# Patient Record
Sex: MALE | Race: WHITE
[De-identification: names, ages, dates, MRNs, and addresses within clinical notes are randomized per-mention and may not be internally consistent; named-entity substitution may affect disease eponyms.]

---

## 2017-06-19 ENCOUNTER — HOSPITAL ENCOUNTER (INPATIENT)
Dept: HOSPITAL 76 - ED | Age: 67
LOS: 1 days | Discharge: ANOTHER HOSPITAL | DRG: 378 | End: 2017-06-20
Attending: INTERNAL MEDICINE | Admitting: NURSE PRACTITIONER
Payer: MEDICARE

## 2017-06-19 DIAGNOSIS — K21.9: ICD-10-CM

## 2017-06-19 DIAGNOSIS — D62: ICD-10-CM

## 2017-06-19 DIAGNOSIS — I10: ICD-10-CM

## 2017-06-19 DIAGNOSIS — Z87.898: ICD-10-CM

## 2017-06-19 DIAGNOSIS — K92.0: Primary | ICD-10-CM

## 2017-06-19 DIAGNOSIS — K44.9: ICD-10-CM

## 2017-06-19 DIAGNOSIS — Z90.49: ICD-10-CM

## 2017-06-19 DIAGNOSIS — K22.4: ICD-10-CM

## 2017-06-19 DIAGNOSIS — I50.9: ICD-10-CM

## 2017-06-19 DIAGNOSIS — K25.4: ICD-10-CM

## 2017-06-19 DIAGNOSIS — Z87.891: ICD-10-CM

## 2017-06-19 DIAGNOSIS — Z79.899: ICD-10-CM

## 2017-06-19 LAB
ALBUMIN/GLOB SERPL: 1.3 {RATIO} (ref 1–2.2)
ANION GAP SERPL CALCULATED.4IONS-SCNC: 12 MMOL/L (ref 6–13)
APTT PPP: 25.9 SECS (ref 24.9–33.3)
BASOPHILS NFR BLD AUTO: 0 10^3/UL (ref 0–0.1)
BASOPHILS NFR BLD AUTO: 0 10^3/UL (ref 0–0.1)
BASOPHILS NFR BLD AUTO: 0.3 %
BASOPHILS NFR BLD AUTO: 0.3 %
BILIRUB BLD-MCNC: 1.8 MG/DL (ref 0.2–1)
BUN SERPL-MCNC: 50 MG/DL (ref 6–20)
CALCIUM UR-MCNC: 8.3 MG/DL (ref 8.5–10.3)
CHLORIDE SERPL-SCNC: 105 MMOL/L (ref 101–111)
CO2 SERPL-SCNC: 22 MMOL/L (ref 21–32)
CREAT SERPLBLD-SCNC: 0.8 MG/DL (ref 0.6–1.2)
EOSINOPHIL # BLD AUTO: 0 10^3/UL (ref 0–0.7)
EOSINOPHIL # BLD AUTO: 0.1 10^3/UL (ref 0–0.7)
EOSINOPHIL NFR BLD AUTO: 0.1 %
EOSINOPHIL NFR BLD AUTO: 0.5 %
ERYTHROCYTE [DISTWIDTH] IN BLOOD BY AUTOMATED COUNT: 13.6 % (ref 12–15)
ERYTHROCYTE [DISTWIDTH] IN BLOOD BY AUTOMATED COUNT: 13.6 % (ref 12–15)
ERYTHROCYTE [DISTWIDTH] IN BLOOD BY AUTOMATED COUNT: 14.6 % (ref 12–15)
ERYTHROCYTE [DISTWIDTH] IN BLOOD BY AUTOMATED COUNT: 14.7 % (ref 12–15)
GFRSERPLBLD MDRD-ARVRAT: 97 ML/MIN/{1.73_M2} (ref 89–?)
GLOBULIN SER-MCNC: 3 G/DL (ref 2.1–4.2)
GLUCOSE SERPL-MCNC: 217 MG/DL (ref 70–100)
HCT VFR BLD AUTO: 24.3 % (ref 42–52)
HCT VFR BLD AUTO: 27.4 % (ref 42–52)
HCT VFR BLD AUTO: 30.9 % (ref 42–52)
HCT VFR BLD AUTO: 35.9 % (ref 42–52)
HGB UR QL STRIP: 10 G/DL (ref 14–18)
HGB UR QL STRIP: 12 G/DL (ref 14–18)
HGB UR QL STRIP: 7.9 G/DL (ref 14–18)
HGB UR QL STRIP: 9.3 G/DL (ref 14–18)
INR PPP: 1.2 (ref 0.8–1.2)
LIPASE SERPL-CCNC: 26 U/L (ref 22–51)
LYMPHOCYTES # SPEC AUTO: 1.2 10^3/UL (ref 1.5–3.5)
LYMPHOCYTES # SPEC AUTO: 2.3 10^3/UL (ref 1.5–3.5)
LYMPHOCYTES NFR BLD AUTO: 10.2 %
LYMPHOCYTES NFR BLD AUTO: 17.9 %
MCH RBC QN AUTO: 29.2 PG (ref 27–31)
MCH RBC QN AUTO: 29.8 PG (ref 27–31)
MCH RBC QN AUTO: 30.5 PG (ref 27–31)
MCH RBC QN AUTO: 31.1 PG (ref 27–31)
MCHC RBC AUTO-ENTMCNC: 32.4 G/DL (ref 32–36)
MCHC RBC AUTO-ENTMCNC: 32.5 G/DL (ref 32–36)
MCHC RBC AUTO-ENTMCNC: 33.5 G/DL (ref 32–36)
MCHC RBC AUTO-ENTMCNC: 33.8 G/DL (ref 32–36)
MCV RBC AUTO: 90.1 FL (ref 80–94)
MCV RBC AUTO: 90.8 FL (ref 80–94)
MCV RBC AUTO: 91.9 FL (ref 80–94)
MCV RBC AUTO: 92 FL (ref 80–94)
MONOCYTES # BLD AUTO: 0.9 10^3/UL (ref 0–1)
MONOCYTES # BLD AUTO: 1 10^3/UL (ref 0–1)
MONOCYTES NFR BLD AUTO: 7.2 %
MONOCYTES NFR BLD AUTO: 7.8 %
NEUTROPHILS # BLD AUTO: 9.6 10^3/UL (ref 1.5–6.6)
NEUTROPHILS # BLD AUTO: 9.8 10^3/UL (ref 1.5–6.6)
NEUTROPHILS # SNV AUTO: 12 X10^3/UL (ref 4.8–10.8)
NEUTROPHILS # SNV AUTO: 13 X10^3/UL (ref 4.8–10.8)
NEUTROPHILS # SNV AUTO: 13.1 X10^3/UL (ref 4.8–10.8)
NEUTROPHILS # SNV AUTO: 21.8 X10^3/UL (ref 4.8–10.8)
NEUTROPHILS NFR BLD AUTO: 73.5 %
NEUTROPHILS NFR BLD AUTO: 82.2 %
NRBC # BLD AUTO: 0 /100WBC
NRBC # BLD AUTO: 0 /100WBC
PDW BLD AUTO: 8 FL (ref 7.4–11.4)
PDW BLD AUTO: 8.1 FL (ref 7.4–11.4)
PDW BLD AUTO: 8.7 FL (ref 7.4–11.4)
PDW BLD AUTO: 8.8 FL (ref 7.4–11.4)
POTASSIUM SERPL-SCNC: 4.3 MMOL/L (ref 3.5–5)
PROT SPEC-MCNC: 6.8 G/DL (ref 6.7–8.2)
PROTHROM ACT/NOR PPP: 13.3 SECS (ref 9.9–12.6)
RBC MAR: 2.64 10^6/UL (ref 4.7–6.1)
RBC MAR: 2.97 10^6/UL (ref 4.7–6.1)
RBC MAR: 3.43 10^6/UL (ref 4.7–6.1)
RBC MAR: 3.95 10^6/UL (ref 4.7–6.1)
SODIUM SERPLBLD-SCNC: 139 MMOL/L (ref 135–145)
WBC # BLD: 12 X10^3/UL
WBC # BLD: 13.1 X10^3/UL

## 2017-06-19 PROCEDURE — 36415 COLL VENOUS BLD VENIPUNCTURE: CPT

## 2017-06-19 PROCEDURE — 85014 HEMATOCRIT: CPT

## 2017-06-19 PROCEDURE — 93005 ELECTROCARDIOGRAM TRACING: CPT

## 2017-06-19 PROCEDURE — 85730 THROMBOPLASTIN TIME PARTIAL: CPT

## 2017-06-19 PROCEDURE — 82248 BILIRUBIN DIRECT: CPT

## 2017-06-19 PROCEDURE — 85610 PROTHROMBIN TIME: CPT

## 2017-06-19 PROCEDURE — 88305 TISSUE EXAM BY PATHOLOGIST: CPT

## 2017-06-19 PROCEDURE — 86920 COMPATIBILITY TEST SPIN: CPT

## 2017-06-19 PROCEDURE — 0W3P8ZZ CONTROL BLEEDING IN GASTROINTESTINAL TRACT, VIA NATURAL OR ARTIFICIAL OPENING ENDOSCOPIC: ICD-10-PCS | Performed by: SURGERY

## 2017-06-19 PROCEDURE — 86850 RBC ANTIBODY SCREEN: CPT

## 2017-06-19 PROCEDURE — 86900 BLOOD TYPING SEROLOGIC ABO: CPT

## 2017-06-19 PROCEDURE — 94002 VENT MGMT INPAT INIT DAY: CPT

## 2017-06-19 PROCEDURE — 96361 HYDRATE IV INFUSION ADD-ON: CPT

## 2017-06-19 PROCEDURE — 85025 COMPLETE CBC W/AUTO DIFF WBC: CPT

## 2017-06-19 PROCEDURE — 82803 BLOOD GASES ANY COMBINATION: CPT

## 2017-06-19 PROCEDURE — 83690 ASSAY OF LIPASE: CPT

## 2017-06-19 PROCEDURE — 96374 THER/PROPH/DIAG INJ IV PUSH: CPT

## 2017-06-19 PROCEDURE — 86901 BLOOD TYPING SEROLOGIC RH(D): CPT

## 2017-06-19 PROCEDURE — 84484 ASSAY OF TROPONIN QUANT: CPT

## 2017-06-19 PROCEDURE — 85018 HEMOGLOBIN: CPT

## 2017-06-19 PROCEDURE — 99285 EMERGENCY DEPT VISIT HI MDM: CPT

## 2017-06-19 PROCEDURE — 80320 DRUG SCREEN QUANTALCOHOLS: CPT

## 2017-06-19 PROCEDURE — 96375 TX/PRO/DX INJ NEW DRUG ADDON: CPT

## 2017-06-19 PROCEDURE — 99284 EMERGENCY DEPT VISIT MOD MDM: CPT

## 2017-06-19 PROCEDURE — 71020: CPT

## 2017-06-19 PROCEDURE — 80053 COMPREHEN METABOLIC PANEL: CPT

## 2017-06-19 RX ADMIN — SODIUM CHLORIDE, PRESERVATIVE FREE SCH ML: 5 INJECTION INTRAVENOUS at 17:12

## 2017-06-19 RX ADMIN — SODIUM CHLORIDE, PRESERVATIVE FREE SCH ML: 5 INJECTION INTRAVENOUS at 17:19

## 2017-06-19 RX ADMIN — SODIUM CHLORIDE SCH MLS/HR: 9 INJECTION, SOLUTION INTRAVENOUS at 17:19

## 2017-06-19 NOTE — XRAY PRELIMINARY REPORT
Accession: [id number]

Exam: XR Chest 2 View PA/LAT

 

IMPRESSION: No acute intrathoracic plain film abnormality. 

 

[facility]

 

SITE ID: [id number]

## 2017-06-19 NOTE — XRAY REPORT
EXAM:

CHEST RADIOGRAPHY

 

EXAM DATE: 6/19/2017 01:47 PM.

 

CLINICAL HISTORY: Hematemesis.

 

COMPARISON: 03/08/2015.

 

TECHNIQUE: 2 views.

 

FINDINGS: 

Lungs/Pleura: No focal opacities evident. No pleural effusion. No pneumothorax. Normal volumes.

 

Mediastinum: Heart and mediastinal contours are unremarkable.

 

Other: There is right humerus internal fixation hardware. No acute bony abnormalities are seen.

 

IMPRESSION: No acute intrathoracic plain film abnormality. 

 

[facility]

Referring Provider Line: [phone]

 

SITE ID: [id number]

## 2017-06-19 NOTE — HISTORY & PHYSICAL EXAMINATION
DATE OF ADMISSION: 2017

 

PRIMARY CARE PHYSICIAN: DELTA Reza.

 

CHIEF COMPLAINT: He developed coffee ground emesis and dark black stool last 
night at the same time.

 

HISTORY OF PRESENT ILLNESS: This is a 66-year-old gentleman who has enjoyed 
relatively good health until now just with hypertension and history of what he 
describes as esophageal spasm, who was feeling well until yesterday when he 
developed a pretty much at the same time black coffee ground emesis. The wife 
says it was approximately a cup and also had black dark stool. He comes to the 
emergency room today without having had an additional coffee-ground emesis or 
black stool. However, with a hemoglobin and hematocrit of 12 and 36 and a white 
count of 13.1, platelets 206,000. He does not had any prior history of GI 
bleed. He does not have a history of bleeding diathesis. He is not on an 
antiplatelet agents, nor anticoagulants, does not take aspirin, does not take 
NSAIDs, is also not on any PPI or H2 blocker, does not take any herbals. He 
does drink alcohol, but says this is 1-2 glasses of wine a night. To the ER, he 
said that it was a glass or 2 of wine every other night. Regarding his 
esophageal spasm he describes this as food getting stuck part way down, it only 
happens a couple times a year and when it does happen, it is uncomfortable all 
the way down. He says that if you gave me some steak right now I could make it 
happen. He denies that he has ever been diagnosed with eosinophilic 
esophagitis. He has never had a manometry. He normally has a formed brown bowel 
movement on pretty much daily basis. With blood loss he denies any shortness of 
breath, no new chest pain, possibly a little bit diaphoretic and a little bit 
lightheaded. His INR is 1.2. Platelets 206,000. He had not yet had orthostatics 
checked before I came to the ER, we did them down there and he had a 
significant drop from systolic of 131/67 to a systolic of 102 with the 
development of lightheadedness and he had to sit down, also his heart rate went 
from . He has already received 1 liter of normal saline in the emergency 
room, he was also seen already by Dr. Burgos of surgery for consideration of 
EGD and colonoscopy. I have not yet spoken with Dr. Burgos. He reports that 
he had a colonoscopy approximately a year ago which he says was completely 
normal. 

 

PAST SURGICAL HISTORY:

1. Ventral hernia repair which has required several surgeries including a colon 
resection.

2. Right arm fracture 2 to 3 years ago. 

 

PAST MEDICAL HISTORY:

1. Hypertension for which he is on lisinopril.

2. History of esophageal spasm.

 

MEDICATIONS AT HOME:

1. Lisinopril thiazide combination is 20:25.

2. He takes p.r.n. nitroglycerin, which I believe is due to his esophageal 
spasms.

 

ALLERGIES: HE HAS WHAT HE REFERS TO AS ALLERGIES TO TYLENOL. THIS IS AN ADVERSE 
DRUG REACTION FORM OF NAUSEA AND VOMITING.

 

SOCIAL HISTORY: He is retired from the [employer], he even spent some time 
living in [facility] [place] He is . His wife is with him. He has 2 children 
who are healthy. As above, he 1-2 drinks of wine per night. Does not smoke any 
illicit's and smoked years ago.

 

FAMILY HISTORY: Parents  well into their 90s. He denies any family history 
of heart disease, diabetes, cancer or any bleeding diathesis.

 

REVIEW OF SYSTEMS: He denies any unintentional weight gain or weight loss. No 
fevers, night sweats, lymphadenopathy, no new activity intolerance other than 
that since yesterday with blood loss.

HEAD, EARS, EYES. NOSE AND, THROAT: No vision changes. No trouble chewing or 
swallowing. No neck stiffness.

CARDIOVASCULAR: No chest pain or pressure suggestive of ischemia. No 
palpitations. No edema. No near syncope only as that described above 
lightheadedness with the blood loss.

RESPIRATORY: No cough, wheezing, or shortness of breath.

GASTROINTESTINAL: As per the HPI.

GENITOURINARY: Denies urinary frequency, dysuria, or hesitancy.

MUSCULOSKELETAL: No complaints of joint pain or swelling. No falls.

SKIN: No changes.

NEUROLOGIC: No weakness, paresthesias, or discoordination. No confusion or 
memory loss.

PSYCHIATRIC None.

ENDOCRINE: No polyuria, polydipsia. No heat or cold intolerance.

HEMATOLOGIC: No easy bruising or bleeding, other than the GI bleed described 
above.

 

PHYSICAL EXAMINATION: 

GENERAL: In the emergency room, orthostatics are as described above with a drop 
in his systolic from 131 to 102 in the upright position and increase in his 
heart rate upon standing gradually from 95 to 115, may have gone further, but I 
was not present in the room for the rest of it. He is a very pleasant gentleman 
who appears stated age 66. His wife is at the bedside. He is lying in bed on 
the stretcher in no acute distress and participates in the interview. On 
standing for orthostatic he definitely was started to feel lightheaded and 
uncomfortable and was sat back down.

HEAD, EYES, EARS, NOSE AND THROAT: He has a normal distribution of hair. His 
sclerae are anicteric. Pupils are approximately 3-4 mm with react to light, and 
extraocular movements intact. Oral mucosa is moist. He has his own dentition, 
which is adequate.

NECK: Supple. Carotids are +2 with no extra sounds. No bruit. He was set up for 
a chest exam. He has no spinal tenderness or CVA tenderness.

CHEST: Clear to auscultation with unlabored respirations.

HEART: Regular S1, S2 with no appreciable extra sounds, periphery is warm and 
dry. There is no edema.

ABDOMEN: Notable for old midline scar and appears to be almost an umbilical 
hernia from old ventral hernia repair. He has active bowel sounds. His abdomen 
is nontender. I think I do feel the liver edge on deep inspiration, which is 
nontender.

EXTREMITIES: As above. Strength is grossly equal and no edema. Reflexes and 
strength were not assessed.

 

DIAGNOSTICS on admission. 

 

DIAGNOSTIC STUDIES: White count 13.1, hemoglobin 12.0, hematocrit 35.9, 
platelets 260,000 and INR is 1.2. Sodium 139, potassium 4.3, chloride 105, 
bicarbonate 32, BUN 50, creatinine 0.8, glucose 217, Total bilirubin is 1.8. 
AST and ALT are 28 and 33, respectively. Albumin is 3.8. Alcohol level is 5. 

 

EKG was normal sinus tachycardia at approximately 100 with no ischemic changes. 
Normal axis.

echo 2013 EF 70%  Grade II diastolic dysfunction

 

In looking for old blood work for comparison there are none in the system.

 

ASSESSMENT AND PLAN:

1. Acute gastrointestinal bleed, suspicious for upper bleed, especially given 
his elevated BUN and coffee ground emesis. He actually has hemodynamic 
instability with orthostasis as noted. In ED he received 2 L NS and Medsurg RN 
indicates patient up in room and no longer orthostatic . He still has good room 
on his hemoglobin thus far, we will have serial hematocrits every 6 hours. He 
also will check orthostatics with the continued normal saline volume 
resuscitation. Will continue that at 100 mL/hour. He has blood that has been 
typed and screened. I will make sure there is a cross available for 3 at all 
times in case this becomes a more brisk bleed. He has a large bore IV available
, he will be on clears tonight, n.p.o. after midnight for possible EGD in the 
morning by Dr. Burgos who has not yet had a formal consultation put in the 
computer. Patient indictes surgeon already spoke with him. He will continue IV 
Protonix twice daily. We will hold off on his lisinopril thiazide combination.

2. Hypertension. As above he is orthostatic. With blood loss we are holding off 
on his antihypertensives.

3. Venous thromboembolism prophylaxis. He will be on SCDs. We will not use any 
pharmacologic agent at this time.

4. CODE STATUS: HE IS A FULL CODE.

5. Slightly elevated total bilirubin. I will fractionate this, is only mildly 
elevated with no concomitant elevation in his transaminases.

6. Normocytic anemia, given that it is normocytic, this is likely all reflected 
to acute GI blood loss.

 

 

 

DD:2017 15:14:00  DT: 2017 18:08  JOB #: [id number]  EXT JOB #:[id number]

ALVIN

## 2017-06-20 VITALS — DIASTOLIC BLOOD PRESSURE: 61 MMHG | SYSTOLIC BLOOD PRESSURE: 108 MMHG

## 2017-06-20 LAB
BASOPHILS NFR BLD AUTO: 0.1 10^3/UL (ref 0–0.1)
BASOPHILS NFR BLD AUTO: 0.5 %
EOSINOPHIL # BLD AUTO: 0 10^3/UL (ref 0–0.7)
EOSINOPHIL NFR BLD AUTO: 0.1 %
ERYTHROCYTE [DISTWIDTH] IN BLOOD BY AUTOMATED COUNT: 15.2 % (ref 12–15)
HCT VFR BLD AUTO: 33.1 % (ref 42–52)
HCT VFR BLD AUTO: 38.8 % (ref 42–52)
HGB UR QL STRIP: 11.2 G/DL (ref 14–18)
HGB UR QL STRIP: 12.8 G/DL (ref 14–18)
LYMPHOCYTES # SPEC AUTO: 1.1 10^3/UL (ref 1.5–3.5)
LYMPHOCYTES NFR BLD AUTO: 8.1 %
MCH RBC QN AUTO: 29.7 PG (ref 27–31)
MCHC RBC AUTO-ENTMCNC: 33.8 G/DL (ref 32–36)
MCV RBC AUTO: 87.9 FL (ref 80–94)
MONOCYTES # BLD AUTO: 0.8 10^3/UL (ref 0–1)
MONOCYTES NFR BLD AUTO: 6 %
NEUTROPHILS # BLD AUTO: 11.9 10^3/UL (ref 1.5–6.6)
NEUTROPHILS # SNV AUTO: 14 X10^3/UL (ref 4.8–10.8)
NEUTROPHILS NFR BLD AUTO: 85.3 %
NRBC # BLD AUTO: 0.1 /100WBC
PDW BLD AUTO: 8.5 FL (ref 7.4–11.4)
RBC MAR: 3.76 10^6/UL (ref 4.7–6.1)
WBC # BLD: 14 X10^3/UL

## 2017-06-20 RX ADMIN — SODIUM CHLORIDE SCH MLS/HR: 9 INJECTION, SOLUTION INTRAVENOUS at 01:27

## 2017-06-22 NOTE — DISCHARGE SUMMARY
DATE OF ADMISSION: 06/19/2017

 

DATE OF DISCHARGE: 06/20/2017

 

DISCHARGE DIAGNOSIS:

1. Massive gastrointestinal bleeding, related to bleeding within a hiatal hernia with diffuse oozing 
and 1 small ulcer.

2. Acute blood loss anemia, status post 5 units packed red blood cells transfusion and 4 units of FFP
.

3. History of esophageal spasm.

4. History of hypertension.

5. History of moderate alcohol use. 

 

HOSPITAL COURSE: For full details on admitting data, please refer to yesterday's dictated H and P. Br
iefly, this 66-year-old man was feeling well until day before yesterday when he developed abdominal d
iscomfort and coffee ground emesis followed by a very dark stool. He presented to the emergency room 
for investigation. He was found to have hemoglobin of 12 with hematocrit of 36 at that time and was a
dmitted to observe more closely. Initial H and H were 12 and 36 and approximately 6 hours later, H an
d H were 9 and 27. He then had another episode of emesis which appeared to be bloody. Our surgeon, Dr Jes Burgos, was contacted, and took him to the OR to do an upper endoscopy. He found a great d
eal of blood, and noted diffuse oozing of blood within the supradiaphragmatic part of the patient's h
ernia near the GE junction. He apparently attempted several epinephrine injections as well as cauteri
zation and I believe placed a couple of clips as well. It took him almost 3 hours to try to get the b
leeding stopped. Currently, it does appear that the bleeding has stopped. However, after his first 2 
units of transfusion, his H and H did come back at 7.9 and 24.3. During the procedure, anesthesia als
o had some trouble with him dropping his blood pressure into the 70s. He was given vasopressors to he
lp keep his blood pressure up as well as loss of fluids. He got 3 more units of packed red blood cell
s, in addition to 4 units of FFP. Currently, he has also been started on a Protonix drip. Immediately
 postop blood pressure had dropped to 89/50, however, once they started to wake him up from anesthesi
a, blood pressure went up to be quite high, so he was then started on a propofol drip. He remains int
ubated and is on a ventilator with assist control and a tidal volume of 550, and is breathing on his 
own. Surgery feels that this patient needs to be moved to a larger center, since he has a significant
 risk of rebleeding. He would like him to have access to both Gastroenterology and surgery, if the bl
eeding should recur and it cannot be controlled. 

 

Other past history is fairly minor, with a ventral hernia repair requiring several surgeries as well 
as a partial colon resection and then the esophageal spasm requiring occasional nitroglycerin. Our hidalgo
rgeon here thinks that the patient had frequent vomiting and retching today and yesterday, which may 
have aggravated the bleeding at the site of his hiatal hernia.

 

CURRENT MEDICATIONS: At home include.

1. Lisinopril/HCTZ 25/20 one every day.

2. Sublingual nitroglycerin p.r.n.

 

MEDICATIONS: In the hospital include.

1. Zofran 4 mg IV q.4h. p.r.n.

2. Protonix drip at 10 mL per hour.

3. Compazine p.r.n.

4. Propofol drip titrated as needed.

5. Normal saline IV fluids.

 

ALLERGIES: THE PATIENT BELIEVES HE HAD A BAD REACTION TO TYLENOL IN THE PAST, WITH NAUSEA AND VOMITIN
G.

 

SOCIAL HISTORY: Notable for being retired from the [employer] and having spent [place] time in [place]. He lives with his wife. He does reportedly drink 1-2 glasses of wine per night.

 

PHYSICAL EXAMINATION:

GENERAL: Today, the patient is currently sedated and intubated.

NECK: Showed no neck stiffness earlier today. There is no obvious JVD or lymphadenopathy.

CARDIOVASCULAR: Regular rate and rhythm.

LUNGS: Clear to auscultation.

ABDOMEN: Has an old midline scar, tenderness is not able to be assessed at this time. No masses are o
bvious. EXTREMITIES: No edema.

NEUROLOGIC: The patient is currently intubated and sedated.

 

ASSESSMENT AND PLAN:

1. Acute gastrointestinal bleed with bleeding within the hiatal hernia and the surgeon notes there wa
s a bleeding erosion found in the gastric body at the diaphragmatic hiatus as well as one medium-size
d nonbleeding, clean based and irregular-shaped ulcer in the cardia. Because he has required so much 
blood replacement for his procedure and regarding concerns of increased risk for rebleeding, he will 
be transferred to a larger center where he will have access to Gastroenterology and backup surgery if
 needed. He remains intubated to protect his airway, and will be transported intubated. He can be ext
ubated once he reaches the ICU at [facility] in [place], depending on the intensivist pre
ference at that time.

2. Hypertension. Lisinopril/HCTZ is on hold.

3. The patient does have a history of mild to moderate alcohol use. It is possible that he drinks mor
e than he has stated, so should be watched for signs of alcohol withdrawal as well.

4. For deep vein thrombosis prophylaxis, he be placed on sequential compression devices. No pharmacol
ogic agents were used.

5. CODE STATUS: HE IS A FULL CODE.

6. He did have a slightly elevated bilirubin with normal transaminases.

7. Acute blood loss anemia. He is status post transfusion of 5 units packed red blood cells, as well 
as 4 units of FFP. Last CBC showed white blood cell count of 21,000, hemoglobin 12.8, hematocrit 38, 
and platelets of 145,000. 

 

DIAGNOSTICS: Last set of vital signs shows temperature of 36.8, heart rate 78, blood pressure 108/61,
 respiratory rate 19 on the ventilator, O2 Saturation 100%. CBC is as noted above. PT on admission wa
s 13, INR 1.2, PTT 25. Chemistry panel on admission was notable for a bun of 50, creatinine of 0.8, g
lucose of 217, calcium 8.3, total bilirubin 1.8. Transaminases are within normal limits. Troponin was
 normal. Lipase was normal as well. Alcohol was less than 5.0. 

 

Surgical consultation was obtained with Dr. Dennis Burgos and upper endoscopy report will be includ
ed with his transfer papers. Chest x-ray in the emergency department showed no acute abnormalities, r
ight humerus internal fixation hardware was noted. EKG was also performed and showed sinus tachycardi
a at a rate of 103 with mildly prolonged QT interval of 538. Otherwise normal axis and intervals, wit
hout signs of ischemia. 

 

DISPOSITION: The patient will be transferred to the care of Dr. Gonzalez, the intensivist at [facility] in [place], [place]. His phone number is [phone]. I also discussed the case with
 Dr. Dawson MD of Gastroenterology, at [facility], as well.

 

[place]'s visit took approximately 1 hour to review the patient's records, review his case with Surge
ry and Gastroenterology and the intensivist at [facility], and then to do the summary and write ord
ers, as well as manage the ventilator pending transfer. 

 

 

 

DD:06/20/2017 01:40:00  DT: 06/20/2017 02:41  JOB #: [id number]  EXT JOB #:[id number]

## 2017-07-03 ENCOUNTER — HOSPITAL ENCOUNTER (OUTPATIENT)
Dept: HOSPITAL 76 - LAB | Age: 67
Discharge: HOME | End: 2017-07-03
Attending: INTERNAL MEDICINE
Payer: MEDICARE

## 2017-07-03 DIAGNOSIS — D62: Primary | ICD-10-CM

## 2017-07-03 LAB
ANION GAP SERPL CALCULATED.4IONS-SCNC: 11 MMOL/L (ref 6–13)
BASOPHILS NFR BLD AUTO: 0.1 10^3/UL (ref 0–0.1)
BASOPHILS NFR BLD AUTO: 1.2 %
BUN SERPL-MCNC: 14 MG/DL (ref 6–20)
CALCIUM UR-MCNC: 9.4 MG/DL (ref 8.5–10.3)
CHLORIDE SERPL-SCNC: 100 MMOL/L (ref 101–111)
CO2 SERPL-SCNC: 25 MMOL/L (ref 21–32)
CREAT SERPLBLD-SCNC: 1 MG/DL (ref 0.6–1.2)
EOSINOPHIL # BLD AUTO: 0.2 10^3/UL (ref 0–0.7)
EOSINOPHIL NFR BLD AUTO: 2 %
ERYTHROCYTE [DISTWIDTH] IN BLOOD BY AUTOMATED COUNT: 14.7 % (ref 12–15)
GFRSERPLBLD MDRD-ARVRAT: 75 ML/MIN/{1.73_M2} (ref 89–?)
GLUCOSE SERPL-MCNC: 99 MG/DL (ref 70–100)
HCT VFR BLD AUTO: 36.4 % (ref 42–52)
HGB UR QL STRIP: 12.4 G/DL (ref 14–18)
LYMPHOCYTES # SPEC AUTO: 2 10^3/UL (ref 1.5–3.5)
LYMPHOCYTES NFR BLD AUTO: 22 %
MCH RBC QN AUTO: 29 PG (ref 27–31)
MCHC RBC AUTO-ENTMCNC: 34.1 G/DL (ref 32–36)
MCV RBC AUTO: 85 FL (ref 80–94)
MONOCYTES # BLD AUTO: 0.7 10^3/UL (ref 0–1)
MONOCYTES NFR BLD AUTO: 8 %
NEUTROPHILS # BLD AUTO: 6.1 10^3/UL (ref 1.5–6.6)
NEUTROPHILS # SNV AUTO: 9.1 X10^3/UL (ref 4.8–10.8)
NEUTROPHILS NFR BLD AUTO: 66.8 %
NRBC # BLD AUTO: 0 /100WBC
PDW BLD AUTO: 7.1 FL (ref 7.4–11.4)
POTASSIUM SERPL-SCNC: 4 MMOL/L (ref 3.5–5)
RBC MAR: 4.28 10^6/UL (ref 4.7–6.1)
SODIUM SERPLBLD-SCNC: 136 MMOL/L (ref 135–145)
WBC # BLD: 9.1 X10^3/UL

## 2017-07-03 PROCEDURE — 80048 BASIC METABOLIC PNL TOTAL CA: CPT

## 2017-07-03 PROCEDURE — 85025 COMPLETE CBC W/AUTO DIFF WBC: CPT

## 2017-07-03 PROCEDURE — 36415 COLL VENOUS BLD VENIPUNCTURE: CPT

## 2018-07-13 ENCOUNTER — HOSPITAL ENCOUNTER (OUTPATIENT)
Dept: HOSPITAL 76 - LAB | Age: 68
Discharge: HOME | End: 2018-07-13
Attending: NURSE PRACTITIONER
Payer: MEDICARE

## 2018-07-13 DIAGNOSIS — I10: Primary | ICD-10-CM

## 2018-07-13 DIAGNOSIS — K76.0: ICD-10-CM

## 2018-07-13 LAB
ALBUMIN DIAFP-MCNC: 4.1 G/DL (ref 3.2–5.5)
ALBUMIN/GLOB SERPL: 1.1 {RATIO} (ref 1–2.2)
ALP SERPL-CCNC: 67 IU/L (ref 42–121)
ALT SERPL W P-5'-P-CCNC: 57 IU/L (ref 10–60)
ANION GAP SERPL CALCULATED.4IONS-SCNC: 9 MMOL/L (ref 6–13)
AST SERPL W P-5'-P-CCNC: 68 IU/L (ref 10–42)
BASOPHILS NFR BLD AUTO: 0.1 10^3/UL (ref 0–0.1)
BASOPHILS NFR BLD AUTO: 0.7 %
BILIRUB BLD-MCNC: 1.6 MG/DL (ref 0.2–1)
BUN SERPL-MCNC: 14 MG/DL (ref 6–20)
CALCIUM UR-MCNC: 8.9 MG/DL (ref 8.5–10.3)
CHLORIDE SERPL-SCNC: 100 MMOL/L (ref 101–111)
CHOLEST SERPL-MCNC: 182 MG/DL
CO2 SERPL-SCNC: 26 MMOL/L (ref 21–32)
CREAT SERPLBLD-SCNC: 0.7 MG/DL (ref 0.6–1.2)
EOSINOPHIL # BLD AUTO: 0.3 10^3/UL (ref 0–0.7)
EOSINOPHIL NFR BLD AUTO: 3.6 %
ERYTHROCYTE [DISTWIDTH] IN BLOOD BY AUTOMATED COUNT: 13.4 % (ref 12–15)
GFRSERPLBLD MDRD-ARVRAT: 112 ML/MIN/{1.73_M2} (ref 89–?)
GLOBULIN SER-MCNC: 3.8 G/DL (ref 2.1–4.2)
GLUCOSE SERPL-MCNC: 103 MG/DL (ref 70–100)
HDLC SERPL-MCNC: 48 MG/DL
HDLC SERPL: 3.8 {RATIO} (ref ?–5)
HGB UR QL STRIP: 15.6 G/DL (ref 14–18)
LDLC SERPL CALC-MCNC: 69 MG/DL
LDLC/HDLC SERPL: 1.4 {RATIO} (ref ?–3.6)
LYMPHOCYTES # SPEC AUTO: 2.2 10^3/UL (ref 1.5–3.5)
LYMPHOCYTES NFR BLD AUTO: 28 %
MCH RBC QN AUTO: 31.1 PG (ref 27–31)
MCHC RBC AUTO-ENTMCNC: 34 G/DL (ref 32–36)
MCV RBC AUTO: 91.6 FL (ref 80–94)
MONOCYTES # BLD AUTO: 0.7 10^3/UL (ref 0–1)
MONOCYTES NFR BLD AUTO: 8.9 %
NEUTROPHILS # BLD AUTO: 4.6 10^3/UL (ref 1.5–6.6)
NEUTROPHILS # SNV AUTO: 7.8 X10^3/UL (ref 4.8–10.8)
NEUTROPHILS NFR BLD AUTO: 58.8 %
PDW BLD AUTO: 8.2 FL (ref 7.4–11.4)
PLATELET # BLD: 186 10^3/UL (ref 130–450)
PROT SPEC-MCNC: 7.9 G/DL (ref 6.7–8.2)
RBC MAR: 5.01 10^6/UL (ref 4.7–6.1)
SODIUM SERPLBLD-SCNC: 135 MMOL/L (ref 135–145)
VLDLC SERPL-SCNC: 65 MG/DL

## 2018-07-13 PROCEDURE — 80061 LIPID PANEL: CPT

## 2018-07-13 PROCEDURE — 83721 ASSAY OF BLOOD LIPOPROTEIN: CPT

## 2018-07-13 PROCEDURE — 36415 COLL VENOUS BLD VENIPUNCTURE: CPT

## 2018-07-13 PROCEDURE — 80053 COMPREHEN METABOLIC PANEL: CPT

## 2018-07-13 PROCEDURE — 85025 COMPLETE CBC W/AUTO DIFF WBC: CPT

## 2018-11-12 ENCOUNTER — HOSPITAL ENCOUNTER (OUTPATIENT)
Age: 68
End: 2018-11-12
Payer: MEDICARE

## 2018-11-12 DIAGNOSIS — K92.2: Primary | ICD-10-CM

## 2018-11-12 LAB
ADD MANUAL DIFF / SLIDE REVIEW: NO
HEMATOCRIT: 44.1 % (ref 41–53)
HEMOGLOBIN: 15 G/DL (ref 13.5–17.5)
MCV RBC: 88.5 FL (ref 80–100)
MEAN CORPUSCULAR HEMOGLOBIN: 30.1 PG (ref 26–34)
MEAN CORPUSCULAR HGB CONC: 34 % (ref 30–36)
PLATELET COUNT: 170 X10^3/UL (ref 150–400)

## 2018-11-12 PROCEDURE — 36415 COLL VENOUS BLD VENIPUNCTURE: CPT

## 2018-11-12 PROCEDURE — 85025 COMPLETE CBC W/AUTO DIFF WBC: CPT

## 2019-03-25 ENCOUNTER — HOSPITAL ENCOUNTER (OUTPATIENT)
Dept: HOSPITAL 76 - DI | Age: 69
Discharge: HOME | End: 2019-03-25
Attending: SURGERY
Payer: MEDICARE

## 2019-03-25 DIAGNOSIS — K46.9: Primary | ICD-10-CM

## 2019-03-25 PROCEDURE — 74176 CT ABD & PELVIS W/O CONTRAST: CPT

## 2019-03-25 NOTE — CT REPORT
Reason:  POSSIBLE MESH MIGRATION, ABDOMINAL HERNIA W/O OSTR

Procedure Date:  03/25/2019   

Accession Number:  [id number] / [id number]                    

Procedure:  CT  - Abdomen/Pelvis WO CPT Code:  

 

FULL RESULT:

 

 

EXAM:

CT ABDOMEN AND PELVIS

 

EXAM DATE: 3/25/2019 03:10 PM.

 

CLINICAL HISTORY: Possible mesh migration, abdominal hernia.

 

COMPARISONS: ABDOMEN/PELVIS W/ 06/09/2014 2:23 PM.

 

TECHNIQUE: Routine helical CT imaging was performed through the abdomen 

and pelvis. IV contrast: No. Enteric contrast: No. Reconstructions: 

Coronal and sagittal.

 

In accordance with CT protocol optimization, one or more of the following 

dose reduction techniques were utilized for this exam: automated exposure 

control, adjustment of mA and/or KV based on patient size, or use of 

iterative reconstructive technique.

 

FINDINGS:

Lung Bases: Unremarkable.

 

Liver: A focal calcification in the noncontrast liver is again seen.

 

Gallbladder/Bile Ducts: Cholelithiasis is noted.

 

Spleen: Normal.

 

Pancreas: Normal.

 

Adrenal Glands: Normal.

 

Kidneys: Normal. No masses or hydronephrosis.

 

Peritoneal Cavity/Bowel: Position of the ventral hernia repair mesh is 

unchanged compared to 2014. Visually, the abdominal wall diastasis 

extends inferiorly beyond the margin of the mesh. The patient is status 

post partial colectomy. No free fluid, free air or adenopathy. No masses 

or acute inflammatory process. The appendix is well visualized and normal.

 

Pelvic Organs: Normal. The bladder and visualized pelvic organs are 

within normal limits.

 

Vasculature: Abdominal atherosclerosis.

 

Bones: No significant abnormality.

 

Other: None.

IMPRESSION: Stable position of mesh with question of abdominal wall 

diastasis inferior to the previous repair.

 

[facility]

## 2019-08-14 ENCOUNTER — HOSPITAL ENCOUNTER (OUTPATIENT)
Dept: HOSPITAL 76 - SDS | Age: 69
Discharge: HOME | End: 2019-08-14
Attending: SURGERY
Payer: MEDICARE

## 2019-08-14 VITALS — DIASTOLIC BLOOD PRESSURE: 87 MMHG | SYSTOLIC BLOOD PRESSURE: 126 MMHG

## 2019-08-14 DIAGNOSIS — Z90.49: ICD-10-CM

## 2019-08-14 DIAGNOSIS — K64.8: ICD-10-CM

## 2019-08-14 DIAGNOSIS — D12.2: ICD-10-CM

## 2019-08-14 DIAGNOSIS — K57.30: ICD-10-CM

## 2019-08-14 DIAGNOSIS — I10: ICD-10-CM

## 2019-08-14 DIAGNOSIS — Z12.11: Primary | ICD-10-CM

## 2019-08-14 DIAGNOSIS — Z79.899: ICD-10-CM

## 2019-08-14 DIAGNOSIS — Z87.891: ICD-10-CM

## 2019-08-14 PROCEDURE — 45380 COLONOSCOPY AND BIOPSY: CPT

## 2019-08-14 PROCEDURE — 0DBK8ZZ EXCISION OF ASCENDING COLON, VIA NATURAL OR ARTIFICIAL OPENING ENDOSCOPIC: ICD-10-PCS | Performed by: SURGERY

## 2020-08-12 ENCOUNTER — HOSPITAL ENCOUNTER (OUTPATIENT)
Age: 70
End: 2020-08-12
Payer: MEDICARE

## 2020-08-12 DIAGNOSIS — M25.642: Primary | ICD-10-CM

## 2020-08-12 PROCEDURE — 73140 X-RAY EXAM OF FINGER(S): CPT

## 2020-08-14 ENCOUNTER — HOSPITAL ENCOUNTER (OUTPATIENT)
Dept: HOSPITAL 76 - LAB | Age: 70
Discharge: HOME | End: 2020-08-14
Attending: FAMILY MEDICINE
Payer: MEDICARE

## 2020-08-14 DIAGNOSIS — I10: Primary | ICD-10-CM

## 2020-08-14 DIAGNOSIS — M10.9: ICD-10-CM

## 2020-08-14 LAB
ALBUMIN DIAFP-MCNC: 4.3 G/DL (ref 3.2–5.5)
ALBUMIN/GLOB SERPL: 1.3 {RATIO} (ref 1–2.2)
ALP SERPL-CCNC: 68 IU/L (ref 42–121)
ALT SERPL W P-5'-P-CCNC: 95 IU/L (ref 10–60)
ANION GAP SERPL CALCULATED.4IONS-SCNC: 12 MMOL/L (ref 6–13)
AST SERPL W P-5'-P-CCNC: 108 IU/L (ref 10–42)
BASOPHILS NFR BLD AUTO: 0.1 10^3/UL (ref 0–0.1)
BASOPHILS NFR BLD AUTO: 0.9 %
BILIRUB BLD-MCNC: 0.9 MG/DL (ref 0.2–1)
BUN SERPL-MCNC: 16 MG/DL (ref 6–20)
CALCIUM UR-MCNC: 9.7 MG/DL (ref 8.5–10.3)
CHLORIDE SERPL-SCNC: 97 MMOL/L (ref 101–111)
CO2 SERPL-SCNC: 29 MMOL/L (ref 21–32)
CREAT SERPLBLD-SCNC: 0.8 MG/DL (ref 0.6–1.2)
CREAT UR-SCNC: 521.5 MG/DL
EOSINOPHIL # BLD AUTO: 0.3 10^3/UL (ref 0–0.7)
EOSINOPHIL NFR BLD AUTO: 2.8 %
ERYTHROCYTE [DISTWIDTH] IN BLOOD BY AUTOMATED COUNT: 12.9 % (ref 12–15)
GLOBULIN SER-MCNC: 3.2 G/DL (ref 2.1–4.2)
GLUCOSE SERPL-MCNC: 134 MG/DL (ref 70–100)
HGB UR QL STRIP: 15.8 G/DL (ref 14–18)
LYMPHOCYTES # SPEC AUTO: 2.2 10^3/UL (ref 1.5–3.5)
LYMPHOCYTES NFR BLD AUTO: 23.1 %
MCH RBC QN AUTO: 31.2 PG (ref 27–31)
MCHC RBC AUTO-ENTMCNC: 33.8 G/DL (ref 32–36)
MCV RBC AUTO: 92.5 FL (ref 80–94)
MICROALBUMIN UR-MCNC: 1.7 MG/DL (ref 0–300)
MICROALBUMIN/CREAT RATIO PNL UR: 3.3 UG/MG (ref ?–30)
MONOCYTES # BLD AUTO: 0.8 10^3/UL (ref 0–1)
MONOCYTES NFR BLD AUTO: 8.4 %
NEUTROPHILS # BLD AUTO: 6 10^3/UL (ref 1.5–6.6)
NEUTROPHILS # SNV AUTO: 9.4 X10^3/UL (ref 4.8–10.8)
NEUTROPHILS NFR BLD AUTO: 64.1 %
PDW BLD AUTO: 9.8 FL (ref 7.4–11.4)
PLATELET # BLD: 184 10^3/UL (ref 130–450)
PROT SPEC-MCNC: 7.5 G/DL (ref 6.7–8.2)
RBC MAR: 5.06 10^6/UL (ref 4.7–6.1)
SODIUM SERPLBLD-SCNC: 138 MMOL/L (ref 135–145)
URATE SERPL-MCNC: 9.6 MG/DL (ref 2.6–7.2)

## 2020-08-14 PROCEDURE — 84550 ASSAY OF BLOOD/URIC ACID: CPT

## 2020-08-14 PROCEDURE — 82043 UR ALBUMIN QUANTITATIVE: CPT

## 2020-08-14 PROCEDURE — 80053 COMPREHEN METABOLIC PANEL: CPT

## 2020-08-14 PROCEDURE — 82570 ASSAY OF URINE CREATININE: CPT

## 2020-08-14 PROCEDURE — 36415 COLL VENOUS BLD VENIPUNCTURE: CPT

## 2020-08-14 PROCEDURE — 85025 COMPLETE CBC W/AUTO DIFF WBC: CPT

## 2020-08-18 ENCOUNTER — HOSPITAL ENCOUNTER (OUTPATIENT)
Dept: HOSPITAL 76 - LAB | Age: 70
Discharge: HOME | End: 2020-08-18
Attending: FAMILY MEDICINE
Payer: MEDICARE

## 2020-08-18 DIAGNOSIS — I10: Primary | ICD-10-CM

## 2020-08-18 DIAGNOSIS — M10.9: ICD-10-CM

## 2020-08-18 LAB
CHOLEST SERPL-MCNC: 202 MG/DL
HDLC SERPL-MCNC: 43 MG/DL
HDLC SERPL: 4.7 {RATIO} (ref ?–5)
LDLC SERPL CALC-MCNC: 111 MG/DL
LDLC/HDLC SERPL: 2.6 {RATIO} (ref ?–3.6)
VLDLC SERPL-SCNC: 48 MG/DL

## 2020-08-18 PROCEDURE — 80061 LIPID PANEL: CPT

## 2020-08-18 PROCEDURE — 36415 COLL VENOUS BLD VENIPUNCTURE: CPT

## 2020-08-18 PROCEDURE — 83721 ASSAY OF BLOOD LIPOPROTEIN: CPT

## 2020-09-13 ENCOUNTER — HOSPITAL ENCOUNTER (OUTPATIENT)
Dept: HOSPITAL 76 - DI | Age: 70
Discharge: HOME | End: 2020-09-13
Attending: FAMILY MEDICINE
Payer: MEDICARE

## 2020-09-13 DIAGNOSIS — Z13.6: Primary | ICD-10-CM

## 2020-09-13 PROCEDURE — 76706 US ABDL AORTA SCREEN AAA: CPT

## 2020-09-13 NOTE — ULTRASOUND REPORT
PROCEDURE:  Aorta Screening

 

INDICATIONS:  SMOKING,HERNIA REPAIR

 

TECHNIQUE:  Real time scanning was performed of the aorta and iliac arteries, with image documentatio
n.  

 

COMPARISON:  CT abdomen pelvis 3/25/2019

 

FINDINGS:  

Aorta:  Proximal aortic diameter measures 2.7 cm.  Mid-aorta measures 1.9 cm.  Distal aortic diameter
 is 1.6 cm.  

 

Iliac arteries:  Right common iliac artery measures 1.4 cm.  Left common iliac artery measures 1.4 cm
.  

 

IMPRESSION:  

No evidence of abdominal aortic or iliac arterial aneurysm.

 

Reviewed by: Edel Sim MD on 9/13/2020 11:48 AM PDT

Approved by: Edel Sim MD on 9/13/2020 11:48 AM PDT

 

 

Station ID:  [id number]

## 2021-01-20 ENCOUNTER — HOSPITAL ENCOUNTER (OUTPATIENT)
Dept: HOSPITAL 76 - COV | Age: 71
Discharge: HOME | End: 2021-01-20
Attending: OPHTHALMOLOGY
Payer: MEDICARE

## 2021-01-20 DIAGNOSIS — H40.1112: ICD-10-CM

## 2021-01-20 DIAGNOSIS — Z01.812: Primary | ICD-10-CM

## 2021-01-20 DIAGNOSIS — Z20.822: ICD-10-CM

## 2022-03-28 ENCOUNTER — HOSPITAL ENCOUNTER (OUTPATIENT)
Age: 72
End: 2022-03-28
Payer: MEDICARE

## 2022-03-28 DIAGNOSIS — I10: ICD-10-CM

## 2022-03-28 DIAGNOSIS — Z20.822: Primary | ICD-10-CM

## 2022-03-28 DIAGNOSIS — E78.5: Primary | ICD-10-CM

## 2022-03-28 DIAGNOSIS — Z20.822: ICD-10-CM

## 2022-03-28 LAB
ADD MANUAL DIFF / SLIDE REVIEW: NO
ALBUMIN SERPL-MCNC: 4.5 G/DL (ref 3.5–5)
ALBUMIN/GLOB SERPL: 1.6 {RATIO} (ref 1–2.8)
ALP SERPL-CCNC: 62 U/L (ref 38–126)
ALT SERPL-CCNC: 28 IU/L (ref ?–50)
BUN SERPL-MCNC: 14 MG/DL (ref 9–20)
CALCIUM SERPL-MCNC: 9.5 MG/DL (ref 8.4–10.2)
CHLORIDE SERPL-SCNC: 111 MMOL/L (ref 98–107)
CHOLEST SERPL-MCNC: 200 MG/DL (ref 140–199)
CO2 SERPL-SCNC: 22 MMOL/L (ref 22–32)
ESTIMATED GLOMERULAR FILT RATE: > 60 ML/MIN (ref 60–?)
GLOBULIN SER CALC-MCNC: 2.9 G/DL (ref 1.7–4.1)
GLUCOSE SERPL-MCNC: 101 MG/DL (ref 80–110)
HDLC SERPL-MCNC: 44 MG/DL (ref 40–60)
HEMATOCRIT: 47.5 % (ref 41–53)
HEMOGLOBIN: 16.2 G/DL (ref 13.5–17.5)
HEMOLYSIS: < 15 (ref 0–50)
LYMPHOCYTES # SPEC AUTO: 1900 /UL (ref 1100–4500)
MCV RBC: 90 FL (ref 80–100)
MEAN CORPUSCULAR HEMOGLOBIN: 30.7 PG (ref 26–34)
MEAN CORPUSCULAR HGB CONC: 34.2 % (ref 30–36)
PLATELET COUNT: 145 X10^3/UL (ref 150–400)
POTASSIUM SERPL-SCNC: 4.1 MMOL/L (ref 3.4–5.1)
PROT SERPL-MCNC: 7.4 G/DL (ref 6.3–8.2)
SODIUM SERPL-SCNC: 141 MMOL/L (ref 137–145)
TRIGL SERPL-MCNC: 275 MG/DL (ref 35–150)

## 2022-03-28 PROCEDURE — 80053 COMPREHEN METABOLIC PANEL: CPT

## 2022-03-28 PROCEDURE — 85025 COMPLETE CBC W/AUTO DIFF WBC: CPT

## 2022-03-28 PROCEDURE — 80061 LIPID PANEL: CPT

## 2022-03-28 PROCEDURE — 87635 SARS-COV-2 COVID-19 AMP PRB: CPT

## 2022-03-28 PROCEDURE — 36415 COLL VENOUS BLD VENIPUNCTURE: CPT

## 2022-04-19 NOTE — CONSULTATION NOTE
Surgery Consult





- Admit Date


Hospital Admission Date: 06/19/17





- Consult Date


Consult Date: 06/19/17


Requesting Provider: ED attending





- Home Meds/Allergies


Home Medications: 


 Patient History











 Medication  Instructions  Recorded  Confirmed


 


Nitroglycerin [Nitrostat] 0.4 mg SL Q5MIN 05/29/14 06/19/17


 


Lisinopril/Hydrochlorothiazide 1 tab PO DAILY 06/19/17 06/19/17





[Lisinopril-Hctz 20-25 mg Tab]   











Allergies/Adverse Reactions: 


 Allergies











Allergy/AdvReac Type Severity Reaction Status Date / Time


 


No Known Drug Allergies Allergy   Verified 06/19/17 12:45














- Consultation Note


Consultation Note: 








PD HPI ABD PAIN





- Stated complaint


Stated Complaint: VOMITING BLOOD





- Chief complaint


Chief Complaint: Abd Pain





- History obtained from


History obtained from: Patient, Family (wife)





- History of Present Illness


67 yo male with extensive surgical hx including colon resection for complicated 

diverticulitis with fistula, multiple abdominal wall mesh repairs, CHF, HTN, 

esophageal spasms. Patient states that has occasional pain in chest ranging 

from 5 seconds to 5 hours and its due to the esophageal spasms and associated 

with vomiting. This time he had dry heaves and vomited up blood. Denies any 

fevers. Has Noted some melana. States had colonoscopy 2 years ago at [facility] and 

some Benign polyps were found. Has noted dark tarry stools recently





Review of Systems


Ten Systems: 10 systems reviewed and negative


Constitutional: denies: Fever, Chills, Fatigue


Cardiac: denies: Palpitations, Pedal edema, Calf pain


Respiratory: denies: Dyspnea, Cough, Hemoptysis, Wheezing


GI: reports: Nausea, Vomiting, Hematemesis, Bloody / black stool.  denies: 

Abdominal Pain





PD PAST MEDICAL HISTORY





- Past Medical History


Cardiovascular: Congestive heart failure, Murmur


Respiratory: None


Neuro: None


Endocrine/Autoimmune: None


GI: Diverticulitis


: None


HEENT: None


Psych: None


Musculoskeletal: Other


Derm: None





- Past Surgical History


Past Surgical History: Yes


General: Gastric surgery, Hiatal hernia repair, Colonoscopy





- Present Medications


Home Medications: 


 Ambulatory Orders











 Medication  Instructions  Recorded  Confirmed


 


Nitroglycerin [Nitrostat] 0.4 mg SL Q5MIN 05/29/14 06/19/17


 


Lisinopril/Hydrochlorothiazide 1 tab PO DAILY 06/19/17 06/19/17





[Lisinopril-Hctz 20-25 mg Tab]   














- Allergies


Allergies/Adverse Reactions: 


 Allergies











Allergy/AdvReac Type Severity Reaction Status Date / Time


 


No Known Drug Allergies Allergy   Verified 06/19/17 12:45














- Social History


Does the pt smoke?: No


Smoking Status: Never smoker


Does the pt drink ETOH?: Yes





- Family History


Family history: reports: Non contributory





- Immunizations


Immunizations are current?: No


Immunizations: TDAP >10years/unknown





- POLST


Patient has POLST: No





PE





- Vitals


Vital signs reviewed: Yes





- General


General: Alert and oriented X 3, Other (coffee-ground emesis at bedside)





- HEENT


HEENT: EOMI





- Neck


Neck: Supple, no meningeal sign, No bony TTP





- Cardiac


Cardiac: No murmur, 





- Respiratory


Respiratory: CTA B/L





- Abdomen


Abdomen: Normal bowel sounds, Soft, Non tender,





- Back


Back: No CVA TTP, No spinal TTP





- Derm


Derm: Normal color, Warm and dry





- Extremities


Extremities: No edema, No calf tenderness / cord





- Neuro


Neuro: Alert and oriented X 3, Normal speech





- Psych


Psych: Normal mood, Normal affect





Results





- Vitals


Vitals: 


 Vital Signs - 24 hr











  06/19/17 06/19/17





  12:42 13:07


 


Temperature 36.9 C 


 


Heart Rate 113 H 96


 


Respiratory 16 18





Rate  


 


Blood Pressure 140/89 H 137/84 H


 


O2 Saturation 100 97








 Oxygen











O2 Source                      Room air

















- EKG (time done)


  ** 1301


Rate: Rate (enter#) (103)


Rhythm: Sinus tachycardia


Axis: Normal


Intervals: Normal MD


QRS: Normal


Ischemia: Normal ST segments


Computer interpretation: Agree with computer





- Labs


Labs: 


 Laboratory Tests











  06/19/17 06/19/17 06/19/17





  12:45 12:45 12:45


 


WBC  13.1 H  


 


RBC  3.95 L  


 


Hgb  12.0 L  


 


Hct  35.9 L  


 


MCV  90.8  


 


MCH  30.5  


 


MCHC  33.5  


 


RDW  13.6  


 


Plt Count  206  


 


MPV  8.7  


 


Neut #  9.6 H  


 


Lymph #  2.3  


 


Mono #  1.0  


 


Eos #  0.1  


 


Baso #  0.0  


 


Absolute Nucleated RBC  0.00  


 


Nucleated RBCs  0.0  


 


PT   13.3 H 


 


INR   1.2 


 


APTT   25.9 


 


Sodium    139


 


Potassium    4.3


 


Chloride    105


 


Carbon Dioxide    22


 


Anion Gap    12.0


 


BUN    50 H


 


Creatinine    0.8


 


Estimated GFR (MDRD)    97


 


Glucose    217 H


 


Calcium    8.3 L


 


Total Bilirubin    1.8 H


 


AST    28


 


ALT    33


 


Alkaline Phosphatase    53


 


Troponin I   


 


Total Protein    6.8


 


Albumin    3.8


 


Globulin    3.0


 


Albumin/Globulin Ratio    1.3


 


Lipase    26


 


Ethyl Alcohol    < 5.0


 


Blood Type   


 


Antibody Screen   


 


Crossmatch IS Only   














  06/19/17 06/19/17





  12:45 12:45


 


WBC  


 


RBC  


 


Hgb  


 


Hct  


 


MCV  


 


MCH  


 


MCHC  


 


RDW  


 


Plt Count  


 


MPV  


 


Neut #  


 


Lymph #  


 


Mono #  


 


Eos #  


 


Baso #  


 


Absolute Nucleated RBC  


 


Nucleated RBCs  


 


PT  


 


INR  


 


APTT  


 


Sodium  


 


Potassium  


 


Chloride  


 


Carbon Dioxide  


 


Anion Gap  


 


BUN  


 


Creatinine  


 


Estimated GFR (MDRD)  


 


Glucose  


 


Calcium  


 


Total Bilirubin  


 


AST  


 


ALT  


 


Alkaline Phosphatase  


 


Troponin I  < 0.04 


 


Total Protein  


 


Albumin  


 


Globulin  


 


Albumin/Globulin Ratio  


 


Lipase  


 


Ethyl Alcohol  


 


Blood Type   O POSITIVE


 


Antibody Screen   NEGATIVE


 


Crossmatch IS Only   See Detail














67 yo male with multiple medical problems now with presumed upper GI bleed.


Serial CBC's


EGD in AM


Transfuse prn


Primary medical team to manage
CHEST PAIN

## 2022-12-06 ENCOUNTER — HOSPITAL ENCOUNTER (OUTPATIENT)
Age: 72
End: 2022-12-06
Payer: MEDICARE

## 2022-12-06 DIAGNOSIS — E78.5: Primary | ICD-10-CM

## 2022-12-06 DIAGNOSIS — K22.2: ICD-10-CM

## 2022-12-06 DIAGNOSIS — I10: ICD-10-CM

## 2022-12-06 LAB
ADD MANUAL DIFF / SLIDE REVIEW: NO
ALBUMIN SERPL-MCNC: 4.4 G/DL (ref 3.5–5)
ALBUMIN/GLOB SERPL: 1.6 {RATIO} (ref 1–2.8)
ALP SERPL-CCNC: 82 U/L (ref 38–126)
ALT SERPL-CCNC: 37 IU/L (ref ?–50)
BUN SERPL-MCNC: 12 MG/DL (ref 9–20)
CALCIUM SERPL-MCNC: 9.3 MG/DL (ref 8.4–10.2)
CHLORIDE SERPL-SCNC: 99 MMOL/L (ref 98–107)
CHOLEST SERPL-MCNC: 207 MG/DL (ref 140–199)
CO2 SERPL-SCNC: 26 MMOL/L (ref 22–32)
ESTIMATED GLOMERULAR FILT RATE: > 60 ML/MIN (ref 60–?)
GLOBULIN SER CALC-MCNC: 2.7 G/DL (ref 1.7–4.1)
GLUCOSE SERPL-MCNC: 90 MG/DL (ref 80–110)
HDLC SERPL-MCNC: 48 MG/DL (ref 40–60)
HEMATOCRIT: 44.6 % (ref 41–53)
HEMOGLOBIN: 15.2 G/DL (ref 13.5–17.5)
HEMOLYSIS: < 15 (ref 0–50)
LYMPHOCYTES # SPEC AUTO: 1700 /UL (ref 1100–4500)
MCV RBC: 90.6 FL (ref 80–100)
MEAN CORPUSCULAR HEMOGLOBIN: 30.8 PG (ref 26–34)
MEAN CORPUSCULAR HGB CONC: 34 % (ref 30–36)
PLATELET COUNT: 147 X10^3/UL (ref 150–400)
POTASSIUM SERPL-SCNC: 3.8 MMOL/L (ref 3.4–5.1)
PROT SERPL-MCNC: 7.1 G/DL (ref 6.3–8.2)
SODIUM SERPL-SCNC: 136 MMOL/L (ref 137–145)
TRIGL SERPL-MCNC: 323 MG/DL (ref 35–150)

## 2022-12-06 PROCEDURE — 85025 COMPLETE CBC W/AUTO DIFF WBC: CPT

## 2022-12-06 PROCEDURE — 80061 LIPID PANEL: CPT

## 2022-12-06 PROCEDURE — 36415 COLL VENOUS BLD VENIPUNCTURE: CPT

## 2022-12-06 PROCEDURE — 80053 COMPREHEN METABOLIC PANEL: CPT

## 2023-06-20 ENCOUNTER — HOSPITAL ENCOUNTER (OUTPATIENT)
Age: 73
End: 2023-06-20
Payer: MEDICARE
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
## 2023-06-20 DIAGNOSIS — M54.50: ICD-10-CM

## 2023-06-20 DIAGNOSIS — S32.028A: Primary | ICD-10-CM

## 2023-06-20 PROCEDURE — 72100 X-RAY EXAM L-S SPINE 2/3 VWS: CPT

## 2023-06-23 ENCOUNTER — HOSPITAL ENCOUNTER (OUTPATIENT)
Dept: HOSPITAL 73 - RAD | Age: 73
End: 2023-06-23
Payer: MEDICARE

## 2023-06-23 DIAGNOSIS — M81.0: ICD-10-CM
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
## 2023-06-23 DIAGNOSIS — M54.50: ICD-10-CM

## 2023-06-23 DIAGNOSIS — M25.60: ICD-10-CM

## 2023-06-23 DIAGNOSIS — Z13.820: ICD-10-CM

## 2023-06-23 DIAGNOSIS — S32.000A: Primary | ICD-10-CM

## 2023-06-23 PROCEDURE — 77080 DXA BONE DENSITY AXIAL: CPT

## 2023-07-17 ENCOUNTER — HOSPITAL ENCOUNTER (EMERGENCY)
Age: 73
Discharge: HOME | End: 2023-07-17
Payer: MEDICARE

## 2023-07-17 VITALS — HEART RATE: 71 BPM | OXYGEN SATURATION: 96 %

## 2023-07-17 VITALS
HEART RATE: 81 BPM | DIASTOLIC BLOOD PRESSURE: 83 MMHG | RESPIRATION RATE: 18 BRPM | OXYGEN SATURATION: 97 % | TEMPERATURE: 98.06 F | SYSTOLIC BLOOD PRESSURE: 130 MMHG

## 2023-07-17 VITALS — OXYGEN SATURATION: 96 % | HEART RATE: 69 BPM

## 2023-07-17 VITALS — HEART RATE: 64 BPM | OXYGEN SATURATION: 97 %

## 2023-07-17 VITALS — HEART RATE: 68 BPM | SYSTOLIC BLOOD PRESSURE: 131 MMHG | DIASTOLIC BLOOD PRESSURE: 90 MMHG | OXYGEN SATURATION: 95 %

## 2023-07-17 VITALS — DIASTOLIC BLOOD PRESSURE: 87 MMHG | HEART RATE: 62 BPM | SYSTOLIC BLOOD PRESSURE: 139 MMHG | OXYGEN SATURATION: 96 %

## 2023-07-17 VITALS — OXYGEN SATURATION: 97 % | SYSTOLIC BLOOD PRESSURE: 147 MMHG | DIASTOLIC BLOOD PRESSURE: 94 MMHG | HEART RATE: 69 BPM

## 2023-07-17 VITALS — OXYGEN SATURATION: 96 % | SYSTOLIC BLOOD PRESSURE: 148 MMHG | HEART RATE: 72 BPM | DIASTOLIC BLOOD PRESSURE: 104 MMHG

## 2023-07-17 VITALS — OXYGEN SATURATION: 96 % | HEART RATE: 71 BPM

## 2023-07-17 VITALS — OXYGEN SATURATION: 98 % | HEART RATE: 73 BPM

## 2023-07-17 VITALS — DIASTOLIC BLOOD PRESSURE: 96 MMHG | HEART RATE: 72 BPM | SYSTOLIC BLOOD PRESSURE: 153 MMHG | OXYGEN SATURATION: 96 %

## 2023-07-17 VITALS — DIASTOLIC BLOOD PRESSURE: 92 MMHG | OXYGEN SATURATION: 97 % | SYSTOLIC BLOOD PRESSURE: 143 MMHG | HEART RATE: 69 BPM

## 2023-07-17 VITALS — HEART RATE: 70 BPM | OXYGEN SATURATION: 97 %

## 2023-07-17 VITALS — DIASTOLIC BLOOD PRESSURE: 76 MMHG | SYSTOLIC BLOOD PRESSURE: 130 MMHG | OXYGEN SATURATION: 95 % | HEART RATE: 66 BPM

## 2023-07-17 VITALS — HEART RATE: 67 BPM | OXYGEN SATURATION: 96 %

## 2023-07-17 VITALS — DIASTOLIC BLOOD PRESSURE: 93 MMHG | HEART RATE: 74 BPM | OXYGEN SATURATION: 96 % | SYSTOLIC BLOOD PRESSURE: 153 MMHG

## 2023-07-17 VITALS
HEART RATE: 69 BPM | DIASTOLIC BLOOD PRESSURE: 87 MMHG | TEMPERATURE: 98.96 F | RESPIRATION RATE: 16 BRPM | SYSTOLIC BLOOD PRESSURE: 140 MMHG | OXYGEN SATURATION: 99 %

## 2023-07-17 VITALS — HEART RATE: 67 BPM | DIASTOLIC BLOOD PRESSURE: 93 MMHG | SYSTOLIC BLOOD PRESSURE: 151 MMHG | OXYGEN SATURATION: 95 %

## 2023-07-17 VITALS — OXYGEN SATURATION: 97 % | HEART RATE: 69 BPM

## 2023-07-17 VITALS — OXYGEN SATURATION: 97 % | HEART RATE: 68 BPM

## 2023-07-17 VITALS — HEART RATE: 69 BPM | OXYGEN SATURATION: 96 %

## 2023-07-17 VITALS — OXYGEN SATURATION: 96 % | HEART RATE: 78 BPM | SYSTOLIC BLOOD PRESSURE: 133 MMHG | DIASTOLIC BLOOD PRESSURE: 88 MMHG

## 2023-07-17 VITALS — OXYGEN SATURATION: 96 % | HEART RATE: 68 BPM

## 2023-07-17 VITALS — BODY MASS INDEX: 25 KG/M2

## 2023-07-17 DIAGNOSIS — R10.9: ICD-10-CM

## 2023-07-17 DIAGNOSIS — K22.2: Primary | ICD-10-CM

## 2023-07-17 DIAGNOSIS — K22.4: ICD-10-CM

## 2023-07-17 LAB
ADD MANUAL DIFF / SLIDE REVIEW: NO
ALBUMIN SERPL-MCNC: 4.8 G/DL (ref 3.5–5)
ALBUMIN/GLOB SERPL: 1.4 {RATIO} (ref 1–2.8)
ALP SERPL-CCNC: 108 U/L (ref 38–126)
ALT SERPL-CCNC: 65 IU/L (ref ?–50)
BUN SERPL-MCNC: 13 MG/DL (ref 9–20)
CALCIUM SERPL-MCNC: 9.5 MG/DL (ref 8.4–10.2)
CHLORIDE SERPL-SCNC: 102 MMOL/L (ref 98–107)
CO2 SERPL-SCNC: 26 MMOL/L (ref 22–32)
ESTIMATED GLOMERULAR FILT RATE: > 60 ML/MIN (ref 60–?)
GLOBULIN SER CALC-MCNC: 3.4 G/DL (ref 1.7–4.1)
GLUCOSE SERPL-MCNC: 103 MG/DL (ref 80–110)
HEMATOCRIT: 48 % (ref 41–53)
HEMOGLOBIN: 16.5 G/DL (ref 13.5–17.5)
HEMOLYSIS: < 15 (ref 0–50)
LIPASE SERPL-CCNC: 649 U/L (ref 23–300)
LYMPHOCYTES # SPEC AUTO: 1700 /UL (ref 1100–4500)
MCV RBC: 89.3 FL (ref 80–100)
MEAN CORPUSCULAR HEMOGLOBIN: 30.6 PG (ref 26–34)
MEAN CORPUSCULAR HGB CONC: 34.3 % (ref 30–36)
PLATELET COUNT: 177 X10^3/UL (ref 150–400)
POTASSIUM SERPL-SCNC: 3.8 MMOL/L (ref 3.4–5.1)
PROT SERPL-MCNC: 8.2 G/DL (ref 6.3–8.2)
SODIUM SERPL-SCNC: 139 MMOL/L (ref 137–145)
URINE COMMENTS: (no result)

## 2023-07-17 PROCEDURE — 96361 HYDRATE IV INFUSION ADD-ON: CPT

## 2023-07-17 PROCEDURE — 80053 COMPREHEN METABOLIC PANEL: CPT

## 2023-07-17 PROCEDURE — 36415 COLL VENOUS BLD VENIPUNCTURE: CPT

## 2023-07-17 PROCEDURE — 85025 COMPLETE CBC W/AUTO DIFF WBC: CPT

## 2023-07-17 PROCEDURE — 93005 ELECTROCARDIOGRAM TRACING: CPT

## 2023-07-17 PROCEDURE — 83690 ASSAY OF LIPASE: CPT

## 2023-07-17 PROCEDURE — 99284 EMERGENCY DEPT VISIT MOD MDM: CPT

## 2023-07-17 PROCEDURE — 96374 THER/PROPH/DIAG INJ IV PUSH: CPT

## 2023-07-17 PROCEDURE — 87086 URINE CULTURE/COLONY COUNT: CPT

## 2023-07-17 PROCEDURE — 81003 URINALYSIS AUTO W/O SCOPE: CPT

## 2023-07-17 PROCEDURE — 81015 MICROSCOPIC EXAM OF URINE: CPT

## 2023-08-10 ENCOUNTER — HOSPITAL ENCOUNTER (OUTPATIENT)
Age: 73
End: 2023-08-10
Payer: OTHER GOVERNMENT

## 2023-08-10 DIAGNOSIS — R74.8: ICD-10-CM

## 2023-08-10 DIAGNOSIS — K22.4: ICD-10-CM

## 2023-08-10 DIAGNOSIS — K22.2: Primary | ICD-10-CM

## 2023-08-10 LAB
ALBUMIN SERPL-MCNC: 4.3 G/DL (ref 3.5–5)
ALBUMIN/GLOB SERPL: 1.5 {RATIO} (ref 1–2.8)
ALP SERPL-CCNC: 124 U/L (ref 38–126)
ALT SERPL-CCNC: 36 IU/L (ref ?–50)
BUN SERPL-MCNC: 14 MG/DL (ref 9–20)
CALCIUM SERPL-MCNC: 9.4 MG/DL (ref 8.4–10.2)
CHLORIDE SERPL-SCNC: 102 MMOL/L (ref 98–107)
CO2 SERPL-SCNC: 25 MMOL/L (ref 22–32)
ESTIMATED GLOMERULAR FILT RATE: > 60 ML/MIN (ref 60–?)
GLOBULIN SER CALC-MCNC: 2.8 G/DL (ref 1.7–4.1)
GLUCOSE SERPL-MCNC: 134 MG/DL (ref 80–110)
HEMOLYSIS: < 15 (ref 0–50)
LIPASE SERPL-CCNC: 82 U/L (ref 23–300)
POTASSIUM SERPL-SCNC: 4 MMOL/L (ref 3.4–5.1)
PROT SERPL-MCNC: 7.1 G/DL (ref 6.3–8.2)
SODIUM SERPL-SCNC: 138 MMOL/L (ref 137–145)

## 2023-08-10 PROCEDURE — 80053 COMPREHEN METABOLIC PANEL: CPT

## 2023-08-10 PROCEDURE — 36415 COLL VENOUS BLD VENIPUNCTURE: CPT

## 2023-08-10 PROCEDURE — 83690 ASSAY OF LIPASE: CPT

## 2023-08-10 PROCEDURE — 82248 BILIRUBIN DIRECT: CPT

## 2024-10-12 ENCOUNTER — HOSPITAL ENCOUNTER (OUTPATIENT)
Age: 74
End: 2024-10-12
Payer: MEDICARE

## 2024-10-12 DIAGNOSIS — R10.9: ICD-10-CM

## 2024-10-12 DIAGNOSIS — Z98.0: ICD-10-CM

## 2024-10-12 DIAGNOSIS — K80.20: Primary | ICD-10-CM

## 2024-10-12 DIAGNOSIS — K76.0: ICD-10-CM

## 2024-10-12 PROCEDURE — 74177 CT ABD & PELVIS W/CONTRAST: CPT

## 2024-10-12 NOTE — DI.CT.S_ITS
PROCEDURE:  CT ABDOMEN PELVIS W CON 
  
INDICATIONS:  Abdominal pain 
  
TECHNIQUE:   
After the administration of intravenous contrast, axial sections acquired from the lung  
bases to the pubic symphysis.  Coronal and sagittal reformats were performed.  For  
radiation dose reduction, the following was used:  automated exposure control, adjustment  
of mA and/or kV according to patient size.   
  
COMPARISON:  [facility], CR, XR LUMBAR SPINE 2 OR 3 VIEWS,  
6/04/2024, 15:17. 
  
FINDINGS:   
Image quality:  Diagnostic.   
  
Lower Chest:  Tiny hiatal hernia. 
  
ABDOMEN: 
Liver: No solid mass.  Calcified granuloma.  Hepatic steatosis. 
Gallbladder:  Small gallstone.  No pericholecystic fluid. 
Biliary ducts:  No biliary dilation.  
Pancreas:  No ductal dilation.  No peripancreatic fluid collection.   
Spleen:  Size is within normal limits.  
Adrenal Glands:  No adrenal nodules.  
Kidneys and Ureters:  No hydronephrosis. No solid mass. No complex renal cystic lesion  
which requires follow up. 
  
Stomach and Bowel:  Rectosigmoid anastomosis.  No diverticulitis.  Diverticulosis.   
Normal appendix.  No small bowel obstruction.  Stomach is not distended. 
Peritoneum:  No abnormal intraperitoneal fluid.  No free air.  
  
Ventral Wall:  Ventral abdominal wall mesh. 
Abdominal Nodes:  No retroperitoneal or mesenteric adenopathy by size criteria.  
Vessels:  Aorta and inferior vena cava are normal in size.  
  
PELVIS: 
Pelvic Organs:  Unremarkable.  
Bladder:  No bladder wall thickening, accounting for underdistention.  
Pelvic Nodes: No enlarged lymph nodes.  
Miscellaneous: No inguinal hernias are seen.  
  
Bones:  No aggressive osseous abnormality.  Sclerotic focus at the left pubic symphysis  
likely a bone island.   L2 compression fracture is unchanged.  Minimal height loss at L1  
1 T9.  These findings appear unchanged were visualized. 
  
IMPRESSION:   
  
1. No acute inflammatory process identified.  No diverticulitis.  Normal appendix.  No  
free fluid. 
  
2. Rectosigmoid anastomosis.  No metastatic disease. 
  
3. Gallstone.   
  
Dictated by: Jordan Arriaga M.D. on 10/13/2024 at 20:35      
Approved by: Jordan Arriaga M.D. on 10/13/2024 at 20:41

## 2024-10-18 ENCOUNTER — HOSPITAL ENCOUNTER (OUTPATIENT)
Age: 74
Discharge: HOME | End: 2024-10-18
Payer: MEDICARE

## 2024-10-18 VITALS
OXYGEN SATURATION: 95 % | RESPIRATION RATE: 16 BRPM | HEART RATE: 74 BPM | DIASTOLIC BLOOD PRESSURE: 90 MMHG | SYSTOLIC BLOOD PRESSURE: 125 MMHG | TEMPERATURE: 97.8 F

## 2024-10-18 VITALS
OXYGEN SATURATION: 95 % | TEMPERATURE: 97.8 F | SYSTOLIC BLOOD PRESSURE: 125 MMHG | RESPIRATION RATE: 16 BRPM | HEART RATE: 77 BPM | DIASTOLIC BLOOD PRESSURE: 90 MMHG

## 2024-10-18 VITALS
RESPIRATION RATE: 16 BRPM | HEART RATE: 70 BPM | SYSTOLIC BLOOD PRESSURE: 132 MMHG | DIASTOLIC BLOOD PRESSURE: 85 MMHG | OXYGEN SATURATION: 95 %

## 2024-10-18 VITALS
OXYGEN SATURATION: 98 % | HEART RATE: 71 BPM | RESPIRATION RATE: 14 BRPM | DIASTOLIC BLOOD PRESSURE: 82 MMHG | TEMPERATURE: 97.5 F | SYSTOLIC BLOOD PRESSURE: 127 MMHG

## 2024-10-18 VITALS
HEART RATE: 79 BPM | DIASTOLIC BLOOD PRESSURE: 92 MMHG | TEMPERATURE: 97.16 F | RESPIRATION RATE: 16 BRPM | OXYGEN SATURATION: 97 % | SYSTOLIC BLOOD PRESSURE: 143 MMHG

## 2024-10-18 DIAGNOSIS — D12.3: ICD-10-CM

## 2024-10-18 DIAGNOSIS — Z86.0100: ICD-10-CM

## 2024-10-18 DIAGNOSIS — D12.2: ICD-10-CM

## 2024-10-18 DIAGNOSIS — Z12.11: Primary | ICD-10-CM

## 2024-10-18 PROCEDURE — 0DJD8ZZ INSPECTION OF LOWER INTESTINAL TRACT, VIA NATURAL OR ARTIFICIAL OPENING ENDOSCOPIC: ICD-10-PCS | Performed by: SURGERY

## 2024-10-18 PROCEDURE — 45385 COLONOSCOPY W/LESION REMOVAL: CPT

## 2024-10-18 NOTE — P.OP.PRE_ITS
Pre-operative Note    
COVID-19    
COVID-19 status: Not tested    
Interval Note    
History & Physical reviewed/Exam performed by Physician: Yes    
Changes to H&P: Yes    
H&P completed within 30 days and has changed as indicated here:: CT showed gal  
lstones, no other abnormalities    
ASA Class (for procedural sedation): II

## 2024-10-18 NOTE — PM.OP.COLON
Operative Date/Time/Diagnoses
Date of procedure: 10/18/24
Time of procedure: 11:03
Pre-op diagnosis: History of polyps
Post-op diagnosis: same

Procedure & Clinicians
Study performed: 
Colonoscopy
Same procedure as scheduled: Yes
Surgeon: Joaquim Hadley
Procedure Notes
Procedure in detail: 
Surgeon: Joaquim Hadley MD

Anesthesia:  Autumn Faustin CRNA

Procedure:  The patient was brought to the endoscopy suite, placed in left lateral decubitus position.  The patient was connected to monitoring devices.  A time-out was performed.  Sedation was administered.  Once the patient was adequately sedated, 
a digital rectal exam was performed and was normal.  The scope was then inserted and advanced to the cecum where the appendiceal orifice was identified and photographed.  The scope was then slowly withdrawn over greater than 6 minutes.  The mucosa 
was thoroughly inspected.  There was a 5 mm polyps in the ascending colon removed with a cold snare.  There were to 5 mm polyps in the transverse colon removed with a cold snare and sent together.  The scope was retroflexed in the rectum.  No other 
abnormalities were noted.  The scope was straightened and removed.  The patient was awakened and brought to recovery.

Scope withdrawal time:  18 minutes

Sedation time:  23 minutes

EBL:  5 mL

Findings:  1 5 mm polyp in the ascending colon and 2 5 mm polyps in the transverse colon

Post-procedure
Disposition: PACU

## 2024-10-18 NOTE — PATH_ITS
[facility] Accession Number: [id number] 
No. of containers..02 Tissue 
.                                                                01 
Material submitted:                                        . 
PART A: colon - ASCENDING POLYP 
PART B: colon - TRANSVERSE POLYP X 2 
.                                                                01 
********************************************************************** 
Diagnosis: 
A. ASCENDING COLON, POLYPECTOMY: 
Tubular adenoma. 
- 
B. TRANSVERSE COLON, POLYPECTOMY (X2): 
Tubular adenoma, multiple fragments. 
I  10/22/2024  1303 Local 
********************************************************************** 
.                                                                01 
Electronically signed:                                     . 
Karin Radford MD, Pathologist 
NPI- [id number] 
.                                                                01 
Gross description:                                         . 
Part A: ASCENDING POLYP: 
Received in formalin is 1 fragment(s) of tan, soft tissue measuring 
0.3 x 0.2 x 0.2 cm submitted entirely in 1 cassette(s) 
Part B: TRANSVERSE POLYP X 2: 
Received in formalin are 3 fragment(s) of tan, soft tissue measuring 
0.2 x 0.2 x 0.1 cm to 1.3 x 0.7 x 0.3 cm submitted entirely in 1 
cassette(s) 
/DAN  10/21/2024  1840 Local 
.                                                                01 
Pathologist provided ICD-10: 
Z86.0100 
.                                                                01 
CPT                                                        . 
551041, [id number] 
Specimen Comment: A courtesy copy of this report has been sent to [phone] 
***Performed at:  [address] 
   [address] [address], [place], [place]  [id number] 
   MD Daniel Toweill MD Phone:  [phone]

## 2024-10-18 NOTE — PM.PREOP
Pre-operative Note
COVID-19
COVID-19 status: Not tested
Interval Note
History & Physical reviewed/Exam performed by Physician: Yes
Changes to H&P: Yes
H&P completed within 30 days and has changed as indicated here:: CT showed gallstones, no other abnormalities
ASA Class (for procedural sedation): II